# Patient Record
Sex: FEMALE | Race: WHITE | Employment: FULL TIME | URBAN - METROPOLITAN AREA
[De-identification: names, ages, dates, MRNs, and addresses within clinical notes are randomized per-mention and may not be internally consistent; named-entity substitution may affect disease eponyms.]

---

## 2017-02-21 ENCOUNTER — OFFICE VISIT (OUTPATIENT)
Dept: ORTHOPEDICS | Facility: CLINIC | Age: 67
End: 2017-02-21

## 2017-02-21 VITALS — BODY MASS INDEX: 20.76 KG/M2 | HEIGHT: 68 IN | WEIGHT: 137 LBS

## 2017-02-21 DIAGNOSIS — M16.11 ARTHRITIS OF RIGHT HIP: Primary | ICD-10-CM
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
## 2017-02-21 NOTE — MR AVS SNAPSHOT
After Visit Summary   2/21/2017    Mariangel Ham    MRN: [id number]           Patient Information     Date Of Birth          1950        Visit Information        Provider Department      2/21/2017 11:30 AM Anthony Herrera MD [facility]        Today's Diagnoses     Arthritis of right hip    -  1       Follow-ups after your visit        Your next 10 appointments already scheduled     Feb 27, 2017  9:30 AM CST   XR JOINT INJECTION ASPIRATION MAJOR RT with [facility],  IMAGING NURSE, [facility] NEURO RAD   [facility] ([facility] [[email or web address] and [facility] [place])    [address] [address] [phone]           Stop drinking 1 hour before the exam.  You may take your medicines as usual, except for blood thinners (Coumadin, Plavix, Ticlid, Persantine, Aggrenox, Pletal, Effient, Brilliant). Talk to your doctor if you take these.  Tell your doctor if:   You have ever had an allergic reaction to X-ray dye (contrast fluid).   There is a chance you may be pregnant.  Please bring a list of your current medicines to your exam. Include vitamins, minerals and over-the-counter medicines.  Please call the Imaging Department at your exam site with any questions.              Future tests that were ordered for you today     Open Future Orders        Priority Expected Expires Ordered    XR Joint Injection Major Right Routine 2/21/2017 2/21/2018 2/21/2017            Who to contact     Please call your clinic at [phone] to:    Ask questions about your health    Make or cancel appointments    Discuss your medicines    Learn about your test results    Speak to your doctor   If you have compliments or concerns about an experience at your clinic, or if you wish to file a complaint, please contact [facility] Patient Relations at [phone] or email us at [email or web address]         Additional Information About Your  "Visit        [employer] Information     [facility] is an electronic gateway that provides easy, online access to your medical records. With [facility], you can request a clinic appointment, read your test results, renew a prescription or communicate with your care team.     To sign up for [facility] visit the website at [email or web address]   You will be asked to enter the access code listed below, as well as some personal information. Please follow the directions to create your username and password.     Your access code is: [email or web address]  Expires: 2017  6:31 AM     Your access code will  in 90 days. If you need help or a new code, please contact your [facility] or call [phone] for assistance.        Care EveryWhere ID     This is your Care EveryWhere ID. This could be used by other organizations to access your [place] medical records  [id number]        Your Vitals Were     Height BMI (Body Mass Index)                1.727 m (5' 8\") 20.83 kg/m2           Blood Pressure from Last 3 Encounters:   16 121/82   16 126/87   16 109/75    Weight from Last 3 Encounters:   17 62.1 kg (137 lb)   16 62.1 kg (137 lb)   16 62.1 kg (137 lb)               Primary Care Provider Office Phone # Fax #    Roxanne Yung [phone]       [facility] [address]  [place] [address]        Thank you!     Thank you for choosing [facility]  for your care. Our goal is always to provide you with excellent care. Hearing back from our patients is one way we can continue to improve our services. Please take a few minutes to complete the written survey that you may receive in the mail after your visit with us. Thank you!             Your Updated Medication List - Protect others around you: Learn how to safely use, store and throw away your medicines at www.disposemymeds.org.          This list is accurate as of: 17 " 12:07 PM.  Always use your most recent med list.                   Brand Name Dispense Instructions for use    buPROPion 150 MG 24 hr tablet    WELLBUTRIN XL     Take 150 mg by mouth daily       * ledipasvir-sofosbuvir  MG per tablet    ledipasvir-sofosbuvir    28 tablet    Take 1 tablet by mouth daily       * ledipasvir-sofosbuvir  MG per tablet    ledipasvir-sofosbuvir    28 tablet    Take 1 tablet by mouth daily       levothyroxine 125 MCG tablet    SYNTHROID/LEVOTHROID     Take 125 mcg by mouth daily       LEXAPRO 20 MG tablet   Generic drug:  escitalopram      Take 20 mg by mouth daily.       LORazepam 0.5 MG tablet    ATIVAN    3 tablet    Take 1 tablet (0.5 mg) by mouth as needed for anxiety (Take 1 tablet prior to MRI and my repeat 60 minutes prior to MRI and at time of MRI if still anxious.)       oxyCODONE 5 MG IR tablet    ROXICODONE     Take  by mouth every 4 hours as needed.       traZODone 50 MG tablet    DESYREL    30 tablet    Take 1 tablet (50 mg) by mouth nightly as needed for sleep       UNABLE TO FIND      MEDICATION NAME: Serovital, Take 4 caps once daily on empty stomach       * Notice:  This list has 2 medication(s) that are the same as other medications prescribed for you. Read the directions carefully, and ask your doctor or other care provider to review them with you.

## 2017-08-10 ENCOUNTER — OFFICE VISIT (OUTPATIENT)
Dept: ORTHOPEDICS | Facility: CLINIC | Age: 67
End: 2017-08-10

## 2017-08-10 VITALS — WEIGHT: 131 LBS | RESPIRATION RATE: 16 BRPM | BODY MASS INDEX: 19.85 KG/M2 | HEIGHT: 68 IN

## 2017-08-10 DIAGNOSIS — M16.11 ARTHRITIS OF RIGHT HIP: Primary | ICD-10-CM

## 2017-08-10 DIAGNOSIS — M54.5 RIGHT LOW BACK PAIN, UNSPECIFIED CHRONICITY, WITH SCIATICA PRESENCE UNSPECIFIED: ICD-10-CM
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
## 2017-08-10 NOTE — MR AVS SNAPSHOT
After Visit Summary   8/10/2017    Mariangel Ham    MRN: [id number]           Patient Information     Date Of Birth          1950        Visit Information        Provider Department      8/10/2017 11:15 AM Anthony Herrera MD [facility]        Today's Diagnoses     Arthritis of right hip    -  1    Right low back pain, unspecified chronicity, with sciatica presence unspecified           Follow-ups after your visit        Your next 10 appointments already scheduled     Aug 21, 2017  2:00 PM CDT   XR LUMBAR TRANSFORAMINAL INJ SINGLE with UCXR3,  IMAGING NURSE,  NEURO RAD   [facility] ([facility])    [address]  1st Floor  [place] [address] [phone]           For nerve root injection, please send or bring copies of any MRIs or other scans you have had.  Bring a list of your current medicines to your exam. (Include vitamins, minerals and over-the-counter medicines.) Leave your valuables at home.  Plan to have someone drive you home afterward.  Stop taking the following medicines (but talk to your doctor first):   If you take blood thinners, you may need to stop taking them a few days before treatment. Talk to your doctor before stopping these medicines.Stop taking Coumadin (warfarin) 3 days before treatment. Restart the day after treatment.   If you take Plavix, Ticlid, Pletal or Persantine, please ask your doctor if you should stop these medicines. You may need extra tests on the morning of your scan. You may take your other medicines as normal.  Stop all food and drink (including water) 3 hours before your test or treatment.  Please tell the doctor:   If you are allergic to X-ray dye (contrast fluid).   If you may be pregnant.  Injections take about 30 to 45 minutes. Most people spend up to 2 hours in the clinic or hospital.  Please call the Imaging Department at your exam site with any questions             "  Future tests that were ordered for you today     Open Future Orders        Priority Expected Expires Ordered    XR Lumbar Transforaminal Inj Single Routine 8/10/2017 8/10/2018 8/10/2017            Who to contact     Please call your clinic at [phone] to:    Ask questions about your health    Make or cancel appointments    Discuss your medicines    Learn about your test results    Speak to your doctor   If you have compliments or concerns about an experience at your clinic, or if you wish to file a complaint, please contact [facility] Patient Relations at [phone] or email us at [email or web address]         Additional Information About Your Visit        [employer] Information     Kinveyt is an electronic gateway that provides easy, online access to your medical records. With Mojiva, you can request a clinic appointment, read your test results, renew a prescription or communicate with your care team.     To sign up for Mojiva visit the website at [email or web address]   You will be asked to enter the access code listed below, as well as some personal information. Please follow the directions to create your username and password.     Your access code is: [email or web address]  Expires: 10/31/2017  6:30 AM     Your access code will  in 90 days. If you need help or a new code, please contact your [facility] or call [phone] for assistance.        Care EveryWhere ID     This is your Care EveryWhere ID. This could be used by other organizations to access your [place] medical records  [id number]        Your Vitals Were     Respirations Height BMI (Body Mass Index)             16 1.727 m (5' 8\") 19.92 kg/m2          Blood Pressure from Last 3 Encounters:   16 121/82   16 126/87   16 109/75    Weight from Last 3 Encounters:   08/10/17 59.4 kg (131 lb)   17 62.1 kg (137 lb)   16 62.1 kg (137 lb)               " Primary Care Provider Office Phone # Fax #    Roxanne Yung [phone]       [address]  [place] [address]        Equal Access to Services     KARYSEVEN JANE : Haddelma rosana martinez eliezer Sogloriaali, waaxda luqadaha, qaybta kaalmada mickey, anat mckeon laLissethavery martínez. So [facility] [phone].    ATENCIÓN: Si habla español, tiene a ferrari disposición servicios gratuitos de asistencia lingüística. Llame al [phone].    We comply with applicable federal civil rights laws and [place] laws. We do not discriminate on the basis of race, color, national origin, age, disability sex, sexual orientation or gender identity.            Thank you!     Thank you for choosing [facility]  for your care. Our goal is always to provide you with excellent care. Hearing back from our patients is one way we can continue to improve our services. Please take a few minutes to complete the written survey that you may receive in the mail after your visit with us. Thank you!             Your Updated Medication List - Protect others around you: Learn how to safely use, store and throw away your medicines at www.disposemymeds.org.          This list is accurate as of: 8/10/17 11:48 AM.  Always use your most recent med list.                   Brand Name Dispense Instructions for use Diagnosis    buPROPion 150 MG 24 hr tablet    WELLBUTRIN XL     Take 150 mg by mouth daily    Chronic hepatitis C without hepatic coma (H)       CYTOMEL PO           * ledipasvir-sofosbuvir  MG per tablet    ledipasvir-sofosbuvir    28 tablet    Take 1 tablet by mouth daily    Chronic hepatitis C without hepatic coma (H)       * ledipasvir-sofosbuvir  MG per tablet    ledipasvir-sofosbuvir    28 tablet    Take 1 tablet by mouth daily    Chronic hepatitis C without hepatic coma (H)       levothyroxine 125 MCG tablet    SYNTHROID/LEVOTHROID     Take 125 mcg by mouth daily    Chronic  hepatitis C without hepatic coma (H)       LEXAPRO 20 MG tablet   Generic drug:  escitalopram      Take 20 mg by mouth daily.        LORazepam 0.5 MG tablet    ATIVAN    3 tablet    Take 1 tablet (0.5 mg) by mouth as needed for anxiety (Take 1 tablet prior to MRI and my repeat 60 minutes prior to MRI and at time of MRI if still anxious.)    Claustrophobia       oxyCODONE 5 MG IR tablet    ROXICODONE     Take  by mouth every 4 hours as needed.        traZODone 50 MG tablet    DESYREL    30 tablet    Take 1 tablet (50 mg) by mouth nightly as needed for sleep        UNABLE TO FIND      MEDICATION NAME: Serovital, Take 4 caps once daily on empty stomach    Chronic hepatitis C without hepatic coma (H)       * Notice:  This list has 2 medication(s) that are the same as other medications prescribed for you. Read the directions carefully, and ask your doctor or other care provider to review them with you.

## 2017-11-16 DIAGNOSIS — M25.562 ACUTE PAIN OF LEFT KNEE: Primary | ICD-10-CM

## 2017-11-17 ENCOUNTER — OFFICE VISIT (OUTPATIENT)
Dept: ORTHOPEDICS | Facility: CLINIC | Age: 67
End: 2017-11-17

## 2017-11-17 VITALS — RESPIRATION RATE: 16 BRPM | HEIGHT: 68 IN | BODY MASS INDEX: 23.48 KG/M2 | WEIGHT: 154.9 LBS

## 2017-11-17 DIAGNOSIS — S82.015A CLOSED NONDISPLACED OSTEOCHONDRAL FRACTURE OF LEFT PATELLA, INITIAL ENCOUNTER: Primary | ICD-10-CM

## 2017-11-17 NOTE — MR AVS SNAPSHOT
"              After Visit Summary   2017    Mariangel Ham    MRN: [id number]           Patient Information     Date Of Birth          1950        Visit Information        Provider Department      2017 1:30 PM Anthony Herrera MD [facility]        Today's Diagnoses     Closed nondisplaced osteochondral fracture of left patella, initial encounter    -  1       Follow-ups after your visit        Who to contact     Please call your clinic at [phone] to:    Ask questions about your health    Make or cancel appointments    Discuss your medicines    Learn about your test results    Speak to your doctor   If you have compliments or concerns about an experience at your clinic, or if you wish to file a complaint, please contact [facility] Patient Relations at [phone] or email us at [email or web address]         Additional Information About Your Visit        MyChart Information     UCampus is an electronic gateway that provides easy, online access to your medical records. With UCampus, you can request a clinic appointment, read your test results, renew a prescription or communicate with your care team.     To sign up for ScanÃ¢â‚¬Â¢Jourt visit the website at [email or web address]   You will be asked to enter the access code listed below, as well as some personal information. Please follow the directions to create your username and password.     Your access code is: [email or web address]  Expires: 2018  6:31 AM     Your access code will  in 90 days. If you need help or a new code, please contact your [facility] or call [phone] for assistance.        Care EveryWhere ID     This is your Care EveryWhere ID. This could be used by other organizations to access your [place] medical records  [id number]        Your Vitals Were     Respirations Height BMI (Body Mass Index)             16 5' 8\" (1.727 m) 23.55 " kg/m2          Blood Pressure from Last 3 Encounters:   08/21/17 128/84   08/24/16 121/82   06/29/16 126/87    Weight from Last 3 Encounters:   11/17/17 154 lb 14.4 oz (70.3 kg)   08/10/17 131 lb (59.4 kg)   02/21/17 137 lb (62.1 kg)               Primary Care Provider Office Phone # Fax #    Roxanne Yung [phone]       [facility] [address]  [place] [address]        Equal Access to Services     [facility]: Hadii aad ku hadasho Soomaali, waaxda luqadaha, qaybta kaalmada adeegyada, anat wood . So [facility] [phone].    ATENCIÓN: Si habla español, tiene a ferrari disposición servicios gratuitos de asistencia lingüística. [facility] [phone].    We comply with applicable federal civil rights laws and [place] laws. We do not discriminate on the basis of race, color, national origin, age, disability, sex, sexual orientation, or gender identity.            Thank you!     Thank you for choosing [facility]  for your care. Our goal is always to provide you with excellent care. Hearing back from our patients is one way we can continue to improve our services. Please take a few minutes to complete the written survey that you may receive in the mail after your visit with us. Thank you!             Your Updated Medication List - Protect others around you: Learn how to safely use, store and throw away your medicines at www.disposemymeds.org.          This list is accurate as of: 11/17/17 11:59 PM.  Always use your most recent med list.                   Brand Name Dispense Instructions for use Diagnosis    buPROPion 150 MG 24 hr tablet    WELLBUTRIN XL     Take 150 mg by mouth daily    Chronic hepatitis C without hepatic coma (H)       CYTOMEL PO           * ledipasvir-sofosbuvir  MG per tablet    HARVONI    28 tablet    Take 1 tablet by mouth daily    Chronic hepatitis C without hepatic coma (H)       * ledipasvir-sofosbuvir  MG  per tablet    HARVONI    28 tablet    Take 1 tablet by mouth daily    Chronic hepatitis C without hepatic coma (H)       levothyroxine 125 MCG tablet    SYNTHROID/LEVOTHROID     Take 125 mcg by mouth daily    Chronic hepatitis C without hepatic coma (H)       LEXAPRO 20 MG tablet   Generic drug:  escitalopram      Take 20 mg by mouth daily.        LORazepam 0.5 MG tablet    ATIVAN    3 tablet    Take 1 tablet (0.5 mg) by mouth as needed for anxiety (Take 1 tablet prior to MRI and my repeat 60 minutes prior to MRI and at time of MRI if still anxious.)    Claustrophobia       oxyCODONE IR 5 MG tablet    ROXICODONE     Take  by mouth every 4 hours as needed.        traZODone 50 MG tablet    DESYREL    30 tablet    Take 1 tablet (50 mg) by mouth nightly as needed for sleep        UNABLE TO FIND      MEDICATION NAME: Serovital, Take 4 caps once daily on empty stomach    Chronic hepatitis C without hepatic coma (H)       UNKNOWN TO PATIENT           * Notice:  This list has 2 medication(s) that are the same as other medications prescribed for you. Read the directions carefully, and ask your doctor or other care provider to review them with you.
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
## 2017-11-17 NOTE — LETTER
[facility]IC[facility]  [address]  [address] [place] [address]        November 17, 2017    Regarding:  Mariangel Ham  [address] [place] [address]              To Whom It May Concern;  Mariangel Ham is under my professional care for a left knee patella fracture. She will be required to wear her knee brace at all times from 11/17/17 until 1/1/18.           Sincerely,        Anthony Herrera MD

## 2020-08-21 ENCOUNTER — TRANSCRIBE ORDERS (OUTPATIENT)
Dept: OTHER | Age: 70
End: 2020-08-21

## 2020-08-21 DIAGNOSIS — M54.2 CHRONIC NECK PAIN: Primary | ICD-10-CM

## 2020-08-21 DIAGNOSIS — G89.29 CHRONIC NECK PAIN: Primary | ICD-10-CM

## 2023-07-11 DIAGNOSIS — M25.562 LEFT KNEE PAIN, UNSPECIFIED CHRONICITY: Primary | ICD-10-CM

## 2023-07-13 NOTE — TELEPHONE ENCOUNTER
DIAGNOSIS: (L) Knee pain - discuss injection / self ref / [employer] / no images   APPOINTMENT DATE: 7/17/23   NOTES STATUS DETAILS   OFFICE NOTE from other specialist Internal 11/17/17, 8/11/16, 2/9/15, 6/7/14, 7/10/13, 5/18/13 - Anthony Herrera MD - [facility]   MEDICATION LIST Care Everywhere    DEXA (osteoporosis/bone health) Care Everywhere (PACS)  [facility]: 8/17/22, 5/2/18, 1/3/14   XRAYS (IMAGES & REPORTS) Internal / Care Everywehre (PACS)  Internal:   Knee Left - 7/17/23 (scheduled), 11/17/17    Knee BL - 11/17/17, 5/3/13    [facility]:   Knee BL - 10/17/17         Records Requested     July 13, 2023 4:27 PM  [id number]   Facility  [facility]  Fax: [phone]    [facility]  Fax: [phone]   Outcome CSS faxed urgent requests to [facility] and  to push images to PACS.     UPDATE 7/14/23 12:43PM - CSS resolved images from [facility] in PACS. Called  specialty clinic to push images to PACS. Images pushed and resolved. Records complete - MMT

## 2023-07-17 ENCOUNTER — ANCILLARY PROCEDURE (OUTPATIENT)
Dept: GENERAL RADIOLOGY | Facility: CLINIC | Age: 73
End: 2023-07-17
Attending: FAMILY MEDICINE
Payer: COMMERCIAL

## 2023-07-17 ENCOUNTER — PRE VISIT (OUTPATIENT)
Dept: ORTHOPEDICS | Facility: CLINIC | Age: 73
End: 2023-07-17

## 2023-07-17 ENCOUNTER — OFFICE VISIT (OUTPATIENT)
Dept: ORTHOPEDICS | Facility: CLINIC | Age: 73
End: 2023-07-17
Payer: COMMERCIAL

## 2023-07-17 DIAGNOSIS — M17.12 PRIMARY OSTEOARTHRITIS OF LEFT KNEE: Primary | ICD-10-CM

## 2023-07-17 DIAGNOSIS — M25.562 LEFT KNEE PAIN, UNSPECIFIED CHRONICITY: ICD-10-CM

## 2023-07-17 PROCEDURE — 99203 OFFICE O/P NEW LOW 30 MIN: CPT | Mod: 25 | Performed by: FAMILY MEDICINE

## 2023-07-17 PROCEDURE — 20610 DRAIN/INJ JOINT/BURSA W/O US: CPT | Mod: LT | Performed by: FAMILY MEDICINE

## 2023-07-17 PROCEDURE — 73562 X-RAY EXAM OF KNEE 3: CPT | Mod: LT | Performed by: RADIOLOGY

## 2023-07-17 RX ORDER — GABAPENTIN 300 MG/1
CAPSULE ORAL
COMMUNITY
Start: 2023-06-13

## 2023-07-17 RX ORDER — TRIAMCINOLONE ACETONIDE 40 MG/ML
40 INJECTION, SUSPENSION INTRA-ARTICULAR; INTRAMUSCULAR
Status: SHIPPED | OUTPATIENT
Start: 2023-07-17

## 2023-07-17 RX ORDER — CYCLOBENZAPRINE HCL 5 MG
TABLET ORAL
COMMUNITY
Start: 2023-03-06

## 2023-07-17 RX ORDER — SPIRONOLACTONE 100 MG/1
100 TABLET, FILM COATED ORAL DAILY
COMMUNITY
Start: 2022-08-01 | End: 2024-06-07

## 2023-07-17 RX ORDER — LIDOCAINE HYDROCHLORIDE 10 MG/ML
4 INJECTION, SOLUTION EPIDURAL; INFILTRATION; INTRACAUDAL; PERINEURAL
Status: SHIPPED | OUTPATIENT
Start: 2023-07-17

## 2023-07-17 RX ORDER — ROSUVASTATIN CALCIUM 20 MG/1
1 TABLET, COATED ORAL DAILY
COMMUNITY
Start: 2023-05-11

## 2023-07-17 RX ADMIN — TRIAMCINOLONE ACETONIDE 40 MG: 40 INJECTION, SUSPENSION INTRA-ARTICULAR; INTRAMUSCULAR at 14:11

## 2023-07-17 RX ADMIN — LIDOCAINE HYDROCHLORIDE 4 ML: 10 INJECTION, SOLUTION EPIDURAL; INFILTRATION; INTRACAUDAL; PERINEURAL at 14:11
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
## 2023-07-17 NOTE — NURSING NOTE
[address] [address]  Dept: [phone]  ______________________________________________________________________________    Patient: Mariangel Ham   : 1950   MRN: [id number]   2023    INVASIVE PROCEDURE SAFETY CHECKLIST    Date: 2023   Procedure: Right knee CSI   Patient Name: Mariangel Ham  MRN: [id number]  YOB: 1950    Action: Complete sections as appropriate. Any discrepancy results in a HARD COPY until resolved.     PRE PROCEDURE:  Patient ID verified with 2 identifiers (name and  or MRN): Yes  Procedure and site verified with patient/designee (when able): Yes  Accurate consent documentation in medical record: Yes  H&P (or appropriate assessment) documented in medical record: Yes  H&P must be up to 20 days prior to procedure and updates within 24 hours of procedure as applicable: NA  Relevant diagnostic and radiology test results appropriately labeled and displayed as applicable: Yes  Procedure site(s) marked with provider initials: NA    TIMEOUT:  Time-Out performed immediately prior to starting procedure, including verbal and active participation of all team members addressing the following:Yes  * Correct patient identify  * Confirmed that the correct side and site are marked  * An accurate procedure consent form  * Agreement on the procedure to be done  * Correct patient position  * Relevant images and results are properly labeled and appropriately displayed  * The need to administer antibiotics or fluids for irrigation purposes during the procedure as applicable   * Safety precautions based on patient history or medication use    DURING PROCEDURE: Verification of correct person, site, and procedures any time the responsibility for care of the patient is transferred to another member of the care team.       Prior to injection, verified patient identity using patient's name and date of birth.  Due  to injection administration, patient instructed to remain in clinic for 15 minutes  afterwards, and to report any adverse reaction to me immediately.    Joint injection was performed.      Drug Amount Wasted:  Yes: 1 mg/ml   Vial/Syringe: Single dose vial  Expiration Date:  02/27      Josy Faustin, [facility]  July 17, 2023
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
## 2024-08-23 DIAGNOSIS — R69 DIAGNOSIS UNKNOWN: Primary | ICD-10-CM

## (undated) RX ORDER — LIDOCAINE HYDROCHLORIDE 10 MG/ML
INJECTION, SOLUTION EPIDURAL; INFILTRATION; INTRACAUDAL; PERINEURAL
Status: DISPENSED
Start: 2023-07-17

## (undated) RX ORDER — TRIAMCINOLONE ACETONIDE 40 MG/ML
INJECTION, SUSPENSION INTRA-ARTICULAR; INTRAMUSCULAR
Status: DISPENSED
Start: 2023-07-17